# Patient Record
Sex: FEMALE | NOT HISPANIC OR LATINO | Employment: UNEMPLOYED | URBAN - METROPOLITAN AREA
[De-identification: names, ages, dates, MRNs, and addresses within clinical notes are randomized per-mention and may not be internally consistent; named-entity substitution may affect disease eponyms.]

---

## 2023-10-09 ENCOUNTER — LAB (OUTPATIENT)
Dept: LAB | Facility: CLINIC | Age: 33
End: 2023-10-09
Payer: COMMERCIAL

## 2023-10-09 ENCOUNTER — OFFICE VISIT (OUTPATIENT)
Dept: INTERNAL MEDICINE | Facility: CLINIC | Age: 33
End: 2023-10-09
Payer: COMMERCIAL

## 2023-10-09 VITALS
HEART RATE: 66 BPM | OXYGEN SATURATION: 97 % | DIASTOLIC BLOOD PRESSURE: 80 MMHG | SYSTOLIC BLOOD PRESSURE: 132 MMHG | WEIGHT: 136.2 LBS | BODY MASS INDEX: 25.06 KG/M2 | HEIGHT: 62 IN

## 2023-10-09 DIAGNOSIS — R25.2 TRISMUS: ICD-10-CM

## 2023-10-09 DIAGNOSIS — R25.2 TRISMUS: Primary | ICD-10-CM

## 2023-10-09 DIAGNOSIS — R68.84 JAW PAIN: ICD-10-CM

## 2023-10-09 LAB
ANION GAP SERPL CALCULATED.3IONS-SCNC: 10 MMOL/L (ref 7–15)
BASO+EOS+MONOS # BLD AUTO: NORMAL 10*3/UL
BASO+EOS+MONOS NFR BLD AUTO: NORMAL %
BASOPHILS # BLD AUTO: 0.1 10E3/UL (ref 0–0.2)
BASOPHILS NFR BLD AUTO: 1 %
BUN SERPL-MCNC: 10.8 MG/DL (ref 6–20)
CALCIUM SERPL-MCNC: 9.4 MG/DL (ref 8.6–10)
CHLORIDE SERPL-SCNC: 103 MMOL/L (ref 98–107)
CREAT SERPL-MCNC: 0.8 MG/DL (ref 0.51–0.95)
CRP SERPL-MCNC: <3 MG/L
DEPRECATED HCO3 PLAS-SCNC: 26 MMOL/L (ref 22–29)
EGFRCR SERPLBLD CKD-EPI 2021: >90 ML/MIN/1.73M2
EOSINOPHIL # BLD AUTO: 0.1 10E3/UL (ref 0–0.7)
EOSINOPHIL NFR BLD AUTO: 2 %
ERYTHROCYTE [DISTWIDTH] IN BLOOD BY AUTOMATED COUNT: 12.1 % (ref 10–15)
GLUCOSE SERPL-MCNC: 92 MG/DL (ref 70–99)
HCT VFR BLD AUTO: 41.7 % (ref 35–47)
HGB BLD-MCNC: 14.5 G/DL (ref 11.7–15.7)
IMM GRANULOCYTES # BLD: 0 10E3/UL
IMM GRANULOCYTES NFR BLD: 0 %
LYMPHOCYTES # BLD AUTO: 2.5 10E3/UL (ref 0.8–5.3)
LYMPHOCYTES NFR BLD AUTO: 37 %
MCH RBC QN AUTO: 30.7 PG (ref 26.5–33)
MCHC RBC AUTO-ENTMCNC: 34.8 G/DL (ref 31.5–36.5)
MCV RBC AUTO: 88 FL (ref 78–100)
MONOCYTES # BLD AUTO: 0.4 10E3/UL (ref 0–1.3)
MONOCYTES NFR BLD AUTO: 6 %
NEUTROPHILS # BLD AUTO: 3.8 10E3/UL (ref 1.6–8.3)
NEUTROPHILS NFR BLD AUTO: 54 %
NRBC # BLD AUTO: 0 10E3/UL
NRBC BLD AUTO-RTO: 0 /100
PLATELET # BLD AUTO: 227 10E3/UL (ref 150–450)
POTASSIUM SERPL-SCNC: 4 MMOL/L (ref 3.4–5.3)
RBC # BLD AUTO: 4.73 10E6/UL (ref 3.8–5.2)
SODIUM SERPL-SCNC: 139 MMOL/L (ref 135–145)
WBC # BLD AUTO: 7 10E3/UL (ref 4–11)

## 2023-10-09 PROCEDURE — 80048 BASIC METABOLIC PNL TOTAL CA: CPT | Performed by: PATHOLOGY

## 2023-10-09 PROCEDURE — 85025 COMPLETE CBC W/AUTO DIFF WBC: CPT | Performed by: PATHOLOGY

## 2023-10-09 PROCEDURE — 86140 C-REACTIVE PROTEIN: CPT | Performed by: PATHOLOGY

## 2023-10-09 PROCEDURE — 36415 COLL VENOUS BLD VENIPUNCTURE: CPT | Performed by: PATHOLOGY

## 2023-10-09 PROCEDURE — 99385 PREV VISIT NEW AGE 18-39: CPT | Mod: GC | Performed by: HOSPITALIST

## 2023-10-09 RX ORDER — CIPROFLOXACIN 500 MG/1
500 TABLET, FILM COATED ORAL 2 TIMES DAILY
COMMUNITY

## 2023-10-09 NOTE — CONFIDENTIAL NOTE
[facility]     Patient Name: Suni Fang  YOB: 1990  MRN: [id number]    Date of Service: October 9, 2023  Chief Complaint: ***         HISTORY OF PRESENT ILLNESS:    ***      Medications and allergies reviewed by me today.          PAST MEDICAL HISTORY:   No past medical history on file.         REVIEW OF SYSTEMS:     10 point ROS was negative except as noted in HPI         HOME MEDICATIONS:     No current outpatient medications on file.     No current facility-administered medications for this visit.            PHYSICAL EXAM:   Vitals: There were no vitals taken for this visit.    Wt Readings from Last 4 Encounters:   No data found for Wt       GENERAL: Alert, interactive, NAD  HEENT: Normocephalic, atraumatic, PERRL, EOMI, no conjunctivitis, anicteric sclera, tympanic membranes translucent with normal light reflex, no cervical LAD, no thyromegaly, no oropharyngeal lesions or erythema  LUNGS: clear to auscultation bilaterally, no crackles or wheezes  HEART: regular rate and rhythm, normal S1 and S2, no murmur appreciated  ABDOMEN: Soft, nontender, nondistended. +BS, no HSM or masses, no rebound or guarding.  MUSCULOSKELETAL: no tenderness to spinous processes, no chest wall tenderness  EXTREMITIES: No LE edema bilaterally, 2+ DP and radial pulses bialterally  SKIN: Warm and dry, no jaundice or acute rashes  NEURO: CN II-XII intact, 5/5 upper and lower extremity strength bilaterally, 2+ biceps and patellar reflexes bilaterally, no dysmetria bilaterally, sensation intact, normal gait  PSYCH: A&O to person, place and time. appropriate mood, normal speech, linear thought.            DATA:     Laboratory Data:  ***  Imaging:  ***          ASSESSMENT & PLAN:     There are no diagnoses linked to this encounter.    #Healthcare Maintenance:  ***    Return to clinic: ***    Patient care plan discussed with attending physician, Dr. Rai, who agreed with above.    Mally De Santiago MD  Internal  Medicine, PGY-3  [phone]

## 2023-10-09 NOTE — NURSING NOTE
"Suni Fang is a 33 year old female patient that presents today in clinic for the following:    Chief Complaint   Patient presents with    Establish Care    Physical     The patient's allergies and medications were reviewed as noted. A set of vitals were recorded as noted without incident: /80 (BP Location: Left arm, Patient Position: Sitting, Cuff Size: Adult Small)   Pulse 66   Ht 1.586 m (5' 2.44\")   Wt 61.8 kg (136 lb 3.2 oz)   LMP 10/07/2023   SpO2 97%   Breastfeeding No   BMI 24.56 kg/m  . The patient does not have any other questions for the provider.    Yomaira Todd, EMT at 3:13 PM on 10/9/2023  "

## 2023-10-09 NOTE — PATIENT INSTRUCTIONS
Use warm compresses over the area.   Complete the 7 day course of antibiotic as you currently are  Will let you know the lab results and the

## 2023-10-09 NOTE — PROGRESS NOTES
Subjective:    and .   Suni presents with her  and son.   Reports that two weeks ago it started, she felt right jaw pain when she was yawning, she had sudden pain on the right of her jaw,   It progressed to swelling at the entrance of her ear and swollen to the point   She no pain with swallowing.      She does endorse that last year she had a right outer infection, which resolved with antibiotic both systematic and drop.   She denies any pain radiation, fevers, chills, night sweats, any new weight loss, any changes in hearing, any vision, smell or taste issues, no oropharyngeal dysphagia,   She has never had anything like this happen to her before. No nausea, vomiting chest pain, dyspnea.   The overlying skin never changed or become irritated.   Denies any numbness or tingling       No chance patient is pregnant, currently during her menstrual period today.   PMH: no significant medical history  Meds: none     Allergies: Augmentin (amoxicillin-clavulanate)  SH: Denies smoke, denies alcohol use, denies recreational drug.   FH: Denies any chronic conditions with 1st degree relatives.     ===============================================================  OBJECTIVE    Vitals: afebrile, P 66, /80, 97% on RA     General: alert and oriented x3, in no apparent cardiopulmonary distress   HEENT: normocephalic, atraumatic, MMM, PERRL, EOMI, no pinna pain to pull bilaterally, normal external ear passage and tympanic membranes bilaterally, no appreciable erythema or cerumen collections, no scleral icterus or chemosis, no conjunctival pallor.   Neck: suppler, mild tenderness on the right mid lower mandible to the soft tissue, warm diffusely no erythema, inner posterior right wall of the buccal cavity tender to palpation, no appreciable swelling of the parotid gland detectable on palpation.   Neurologic: CN II-XII grossly intact, normal gait, sensation grossly intact   Cardiac: Regular rate and  rhythm, normal S1, S2, no murmurs gallops or rubs    Pulmonary: chest clear to auscultation bilaterally, no dullness to percussion, no wheezes, rales, or rhonchi   Lymphatic: No cervical, supra/infraclavicular lymphadenopathy.   Extremities: warm and well perfused.   Musculoskeletal: limited opening of the lower mandible (mild limitation)   Psychiatric: Linear thought processes, normal speech, mood appropriate affect.       Labs Reviewed:  None available   Imaging Reviewed: None available.     ASSESSMENT AND PLAN  33 y.o. female with no significant medical history  presents with right jaw pain. Had already started 5 day course of ciprofloxacin 500 BID (self-prescribed) from old meds from [place].   Differential includes abscess, parotid glandular outlet obstruction. Encouraged to complete the full 7 day course of ciprofloxacin. Will pursue imaging, labs as below and intervene as needed.    Suni was seen today for establish care and physical.    Diagnoses and all orders for this visit:    Trismus  -     Cancel: CT Soft Tissue Neck w/o & w Contrast; Future  -     Basic metabolic panel  (Ca, Cl, CO2, Creat, Gluc, K, Na, BUN); Future  -     CT Facial Bones with Contrast; Future  -     CRP, inflammation; Future  -     CBC with platelets and differential; Future  -     CT Soft Tissue Neck w Contrast; Future    Jaw pain  -     Cancel: CT Soft Tissue Neck w/o & w Contrast; Future  -     Basic metabolic panel  (Ca, Cl, CO2, Creat, Gluc, K, Na, BUN); Future  -     CT Facial Bones with Contrast; Future  -     CRP, inflammation; Future  -     CBC with platelets and differential; Future  -     CT Soft Tissue Neck w Contrast; Future        Health Maintenance    - Plan to discuss Vaccines: HPV,  Tdap, covid-19, influenza, plan to perform counseling as needed     - Per patient PAP in last two years, records may be helpful to see if HR HPV testing.      Follow up to establish care later this week 10/12 with any other resident, can  staff with Dr. Rai.     Mally De Santiago MD   PGY3   Internal Medicine

## 2023-10-11 ENCOUNTER — ANCILLARY PROCEDURE (OUTPATIENT)
Dept: CT IMAGING | Facility: CLINIC | Age: 33
End: 2023-10-11
Attending: HOSPITALIST
Payer: COMMERCIAL

## 2023-10-11 DIAGNOSIS — R68.84 JAW PAIN: ICD-10-CM

## 2023-10-11 DIAGNOSIS — R25.2 TRISMUS: ICD-10-CM

## 2023-10-11 PROCEDURE — 70487 CT MAXILLOFACIAL W/DYE: CPT | Performed by: STUDENT IN AN ORGANIZED HEALTH CARE EDUCATION/TRAINING PROGRAM

## 2023-10-11 PROCEDURE — 70491 CT SOFT TISSUE NECK W/DYE: CPT | Performed by: STUDENT IN AN ORGANIZED HEALTH CARE EDUCATION/TRAINING PROGRAM

## 2023-10-11 RX ORDER — IOPAMIDOL 755 MG/ML
90 INJECTION, SOLUTION INTRAVASCULAR ONCE
Status: COMPLETED | OUTPATIENT
Start: 2023-10-11 | End: 2023-10-11

## 2023-10-11 RX ADMIN — IOPAMIDOL 90 ML: 755 INJECTION, SOLUTION INTRAVASCULAR at 17:01

## 2023-10-11 NOTE — DISCHARGE INSTRUCTIONS

## 2023-10-12 ENCOUNTER — OFFICE VISIT (OUTPATIENT)
Dept: INTERNAL MEDICINE | Facility: CLINIC | Age: 33
End: 2023-10-12
Payer: COMMERCIAL

## 2023-10-12 ENCOUNTER — ANCILLARY PROCEDURE (OUTPATIENT)
Dept: ULTRASOUND IMAGING | Facility: CLINIC | Age: 33
End: 2023-10-12
Attending: PEDIATRICS
Payer: COMMERCIAL

## 2023-10-12 ENCOUNTER — LAB (OUTPATIENT)
Dept: LAB | Facility: CLINIC | Age: 33
End: 2023-10-12
Payer: COMMERCIAL

## 2023-10-12 VITALS
WEIGHT: 139.2 LBS | SYSTOLIC BLOOD PRESSURE: 122 MMHG | BODY MASS INDEX: 25.1 KG/M2 | OXYGEN SATURATION: 96 % | HEART RATE: 64 BPM | DIASTOLIC BLOOD PRESSURE: 79 MMHG

## 2023-10-12 DIAGNOSIS — Z00.00 PREVENTATIVE HEALTH CARE: ICD-10-CM

## 2023-10-12 DIAGNOSIS — R22.1 MASS OF THYROID REGION: ICD-10-CM

## 2023-10-12 DIAGNOSIS — R22.1 MASS OF THYROID REGION: Primary | ICD-10-CM

## 2023-10-12 DIAGNOSIS — Z11.59 NEED FOR HEPATITIS C SCREENING TEST: ICD-10-CM

## 2023-10-12 DIAGNOSIS — Z11.4 SCREENING FOR HIV (HUMAN IMMUNODEFICIENCY VIRUS): Primary | ICD-10-CM

## 2023-10-12 DIAGNOSIS — R68.84 JAW PAIN: ICD-10-CM

## 2023-10-12 LAB
CALCIUM SERPL-MCNC: 9.4 MG/DL (ref 8.6–10)
PTH-INTACT SERPL-MCNC: 24 PG/ML (ref 15–65)
TSH SERPL DL<=0.005 MIU/L-ACNC: 1.1 UIU/ML (ref 0.3–4.2)

## 2023-10-12 PROCEDURE — 83970 ASSAY OF PARATHORMONE: CPT

## 2023-10-12 PROCEDURE — 86803 HEPATITIS C AB TEST: CPT | Performed by: PEDIATRICS

## 2023-10-12 PROCEDURE — 84443 ASSAY THYROID STIM HORMONE: CPT

## 2023-10-12 PROCEDURE — 36415 COLL VENOUS BLD VENIPUNCTURE: CPT | Performed by: PATHOLOGY

## 2023-10-12 PROCEDURE — 90686 IIV4 VACC NO PRSV 0.5 ML IM: CPT

## 2023-10-12 PROCEDURE — 90471 IMMUNIZATION ADMIN: CPT

## 2023-10-12 PROCEDURE — 82310 ASSAY OF CALCIUM: CPT

## 2023-10-12 PROCEDURE — 99000 SPECIMEN HANDLING OFFICE-LAB: CPT | Performed by: PATHOLOGY

## 2023-10-12 PROCEDURE — 99214 OFFICE O/P EST MOD 30 MIN: CPT | Mod: GC

## 2023-10-12 PROCEDURE — 76536 US EXAM OF HEAD AND NECK: CPT | Mod: GC | Performed by: RADIOLOGY

## 2023-10-12 PROCEDURE — 87389 HIV-1 AG W/HIV-1&-2 AB AG IA: CPT | Performed by: PEDIATRICS

## 2023-10-12 NOTE — PROGRESS NOTES
[facility]       SUBJECTIVE:  Suni Fang is a 33 year old female with no relevant past medical history who comes in for establish care.     She saw Dr. De Santiago 3 days ago for right jaw pain and took a full 7 day course of ciprofloxacin obtained from outside of clinic. CT scan of face, neck showed no finding to explain jaw pain and had incidentally noted ovoid-shaped structure posterior to right thyroid lobe. CBC, BMP, CRP unremarkable.  No thyroid symptoms.    Reports history of hepatitis B immune patient, states she was screened for HIV and hepatitis C in the past.  She will plan to upload her medical record.    Medications and allergies reviewed by me today.     OBJECTIVE:    /79 (BP Location: Right arm, Patient Position: Sitting, Cuff Size: Adult Regular)   Pulse 64   Wt 63.1 kg (139 lb 3.2 oz)   LMP 10/07/2023   SpO2 96%   BMI 25.10 kg/m     Wt Readings from Last 1 Encounters:   10/12/23 63.1 kg (139 lb 3.2 oz)       GENERAL APPEARANCE: healthy, alert and no distress     NECK: no adenopathy, no asymmetry, masses, or scars and thyroid normal to palpation     RESP: lungs clear to auscultation     CV: regular rates and rhythm, normal S1 S2, no murmur     ABDOMEN:  soft, nondistended     MS: extremities normal- no gross deformities noted     SKIN: no suspicious lesions or rashes     NEURO: Normal strength and tone, sensory exam grossly normal, mentation intact and speech normal     PSYCH: mentation appears normal. and affect normal/bright     ASSESSMENT/PLAN:    Suni was seen today for follow up.    Diagnoses and all orders for this visit:    Mass of thyroid region  Asymptomatic at this point.  However will work-up with imaging, labs.  -     US Thyroid; Future  -     TSH with free T4 reflex; Future  -     Parathyroid Hormone Intact; Future  -     Calcium; Future    Jaw pain  Significantly improved from her last visit 3 days ago.  CT, labs were unremarkable.  She did  finish her course of ciprofloxacin that was obtained from [place].  Unclear etiology at this time.    Other orders  -     REVIEW OF HEALTH MAINTENANCE PROTOCOL ORDERS  -     INFLUENZA VACCINE >6 MONTHS (AFLURIA/FLUZONE)    Pt should return to clinic for f/u with Dr. Rai as needed    Andreas Jenkins MD  Oct 12, 2023    Pt was seen and plan of care discussed with Dr. Hancock.

## 2023-10-12 NOTE — PROGRESS NOTES
I, Vishnu Hancock MD saw the patient with the resident, and agree with the resident's findings and plan of care as documented in the resident's note.  /79 (BP Location: Right arm, Patient Position: Sitting, Cuff Size: Adult Regular)   Pulse 64   Wt 63.1 kg (139 lb 3.2 oz)   LMP 10/07/2023   SpO2 96%   BMI 25.10 kg/m    I personally reviewed vital signs and past record.  Key findings: follow-up, also incidentally foudn to have a thyroid mass, will evaluate.

## 2023-10-12 NOTE — PROGRESS NOTES
Suni is a 33 year old that presents in clinic today for the following:     Chief Complaint   Patient presents with    Follow Up           10/12/2023     1:37 PM   Additional Questions   Roomed by YW   Accompanied by        Screenings from encounters over the past 10 days    No data recorded       SHARYN Kaur at 1:40 PM on 10/12/2023

## 2023-10-13 LAB
HCV AB SERPL QL IA: NONREACTIVE
HIV 1+2 AB+HIV1 P24 AG SERPL QL IA: NONREACTIVE

## 2023-10-22 ENCOUNTER — HEALTH MAINTENANCE LETTER (OUTPATIENT)
Age: 33
End: 2023-10-22

## 2024-12-15 ENCOUNTER — HEALTH MAINTENANCE LETTER (OUTPATIENT)
Age: 34
End: 2024-12-15

## 2025-06-04 ENCOUNTER — PATIENT OUTREACH (OUTPATIENT)
Dept: CARE COORDINATION | Facility: CLINIC | Age: 35
End: 2025-06-04
Payer: COMMERCIAL

## 2025-06-04 NOTE — PROGRESS NOTES
Clinic Care Coordination Contact  Situation: Patient chart reviewed by RN.    Background: Patient identified via external discharged list.     Assessment: Per chart review, patient has OB admission- not a candidate for TCM.    Plan/Recommendations: RN will not complete TCM call.

## 2025-06-05 ENCOUNTER — MEDICAL CORRESPONDENCE (OUTPATIENT)
Dept: HEALTH INFORMATION MANAGEMENT | Facility: CLINIC | Age: 35
End: 2025-06-05
Payer: COMMERCIAL

## 2025-07-01 ENCOUNTER — MEDICAL CORRESPONDENCE (OUTPATIENT)
Dept: HEALTH INFORMATION MANAGEMENT | Facility: CLINIC | Age: 35
End: 2025-07-01
Payer: COMMERCIAL

## 2025-08-04 ENCOUNTER — MEDICAL CORRESPONDENCE (OUTPATIENT)
Dept: HEALTH INFORMATION MANAGEMENT | Facility: CLINIC | Age: 35
End: 2025-08-04
Payer: COMMERCIAL